# Patient Record
Sex: MALE | Race: WHITE
[De-identification: names, ages, dates, MRNs, and addresses within clinical notes are randomized per-mention and may not be internally consistent; named-entity substitution may affect disease eponyms.]

---

## 2019-03-19 ENCOUNTER — HOSPITAL ENCOUNTER (INPATIENT)
Dept: HOSPITAL 90 - EDH | Age: 74
LOS: 5 days | Discharge: HOME | DRG: 246 | End: 2019-03-24
Attending: FAMILY MEDICINE | Admitting: FAMILY MEDICINE
Payer: COMMERCIAL

## 2019-03-19 VITALS — SYSTOLIC BLOOD PRESSURE: 125 MMHG | DIASTOLIC BLOOD PRESSURE: 84 MMHG

## 2019-03-19 VITALS — DIASTOLIC BLOOD PRESSURE: 103 MMHG | SYSTOLIC BLOOD PRESSURE: 137 MMHG

## 2019-03-19 VITALS — SYSTOLIC BLOOD PRESSURE: 105 MMHG | DIASTOLIC BLOOD PRESSURE: 73 MMHG

## 2019-03-19 VITALS — BODY MASS INDEX: 34.96 KG/M2 | HEIGHT: 72 IN | WEIGHT: 258.1 LBS

## 2019-03-19 DIAGNOSIS — Z79.01: ICD-10-CM

## 2019-03-19 DIAGNOSIS — I34.0: ICD-10-CM

## 2019-03-19 DIAGNOSIS — N18.9: ICD-10-CM

## 2019-03-19 DIAGNOSIS — Z95.818: ICD-10-CM

## 2019-03-19 DIAGNOSIS — I24.8: ICD-10-CM

## 2019-03-19 DIAGNOSIS — I50.23: ICD-10-CM

## 2019-03-19 DIAGNOSIS — E11.21: ICD-10-CM

## 2019-03-19 DIAGNOSIS — I48.92: ICD-10-CM

## 2019-03-19 DIAGNOSIS — I48.91: ICD-10-CM

## 2019-03-19 DIAGNOSIS — G24.9: ICD-10-CM

## 2019-03-19 DIAGNOSIS — E11.22: ICD-10-CM

## 2019-03-19 DIAGNOSIS — Z95.5: ICD-10-CM

## 2019-03-19 DIAGNOSIS — E66.9: ICD-10-CM

## 2019-03-19 DIAGNOSIS — I13.0: Primary | ICD-10-CM

## 2019-03-19 DIAGNOSIS — I25.10: ICD-10-CM

## 2019-03-19 DIAGNOSIS — I42.9: ICD-10-CM

## 2019-03-19 DIAGNOSIS — N17.9: ICD-10-CM

## 2019-03-19 LAB
ALBUMIN SERPL-MCNC: 3.2 G/DL (ref 3.5–5)
ALT SERPL-CCNC: 77 U/L (ref 12–78)
APTT PPP: 35.6 SEC (ref 26.3–35.5)
AST SERPL-CCNC: 44 U/L (ref 10–37)
BASOPHILS NFR BLD AUTO: 1.2 % (ref 0–5)
BILIRUB SERPL-MCNC: 1.3 MG/DL (ref 0.2–1)
BNP SERPL-MCNC: 815 PG/ML (ref 0–100)
BUN SERPL-MCNC: 22 MG/DL (ref 7–18)
CHLORIDE SERPL-SCNC: 103 MMOL/L (ref 101–111)
CK SERPL-CCNC: 45 U/L (ref 21–232)
CK SERPL-CCNC: 50 U/L (ref 21–232)
CO2 SERPL-SCNC: 25 MMOL/L (ref 21–32)
CREAT SERPL-MCNC: 1.7 MG/DL (ref 0.5–1.5)
EOSINOPHIL NFR BLD AUTO: 3.9 % (ref 0–8)
ERYTHROCYTE [DISTWIDTH] IN BLOOD BY AUTOMATED COUNT: 14.3 % (ref 11–15.5)
GFR SERPL CREATININE-BSD FRML MDRD: 42 ML/MIN (ref 60–?)
GLUCOSE SERPL-MCNC: 192 MG/DL (ref 70–105)
HCT VFR BLD AUTO: 45.7 % (ref 42–54)
INR PPP: 1.46 (ref 0.85–1.15)
LYMPHOCYTES NFR SPEC AUTO: 12.6 % (ref 21–51)
MCH RBC QN AUTO: 30.1 PG (ref 27–33)
MCHC RBC AUTO-ENTMCNC: 32.8 G/DL (ref 32–36)
MCV RBC AUTO: 91.9 FL (ref 79–99)
MONOCYTES NFR BLD AUTO: 9.6 % (ref 3–13)
MYOGLOBIN SERPL-MCNC: 63 NG/ML (ref 10–92)
MYOGLOBIN SERPL-MCNC: 77 NG/ML (ref 10–92)
NEUTROPHILS NFR BLD AUTO: 72.7 % (ref 40–77)
NRBC BLD MANUAL-RTO: 0 % (ref 0–0.19)
PLATELET # BLD AUTO: 171 K/UL (ref 130–400)
POTASSIUM SERPL-SCNC: 3.7 MMOL/L (ref 3.5–5.1)
PROT SERPL-MCNC: 6.3 G/DL (ref 6–8.3)
PROTHROMBIN TIME: 15.2 SEC (ref 9.6–11.6)
RBC # BLD AUTO: 4.97 MIL/UL (ref 4.5–6.2)
SODIUM SERPL-SCNC: 139 MMOL/L (ref 136–145)
TROPONIN I SERPL-MCNC: 0.1 NG/ML (ref 0–0.06)
TROPONIN I SERPL-MCNC: 0.11 NG/ML (ref 0–0.06)
WBC # BLD AUTO: 7.9 K/UL (ref 4.8–10.8)

## 2019-03-19 PROCEDURE — 96374 THER/PROPH/DIAG INJ IV PUSH: CPT

## 2019-03-19 PROCEDURE — 93005 ELECTROCARDIOGRAM TRACING: CPT

## 2019-03-19 PROCEDURE — 80048 BASIC METABOLIC PNL TOTAL CA: CPT

## 2019-03-19 PROCEDURE — 4A023N7 MEASUREMENT OF CARDIAC SAMPLING AND PRESSURE, LEFT HEART, PERCUTANEOUS APPROACH: ICD-10-PCS | Performed by: INTERNAL MEDICINE

## 2019-03-19 PROCEDURE — 82550 ASSAY OF CK (CPK): CPT

## 2019-03-19 PROCEDURE — 93458 L HRT ARTERY/VENTRICLE ANGIO: CPT

## 2019-03-19 PROCEDURE — 83880 ASSAY OF NATRIURETIC PEPTIDE: CPT

## 2019-03-19 PROCEDURE — 82948 REAGENT STRIP/BLOOD GLUCOSE: CPT

## 2019-03-19 PROCEDURE — 78452 HT MUSCLE IMAGE SPECT MULT: CPT

## 2019-03-19 PROCEDURE — B2151ZZ FLUOROSCOPY OF LEFT HEART USING LOW OSMOLAR CONTRAST: ICD-10-PCS | Performed by: INTERNAL MEDICINE

## 2019-03-19 PROCEDURE — 85027 COMPLETE CBC AUTOMATED: CPT

## 2019-03-19 PROCEDURE — 85025 COMPLETE CBC W/AUTO DIFF WBC: CPT

## 2019-03-19 PROCEDURE — 85730 THROMBOPLASTIN TIME PARTIAL: CPT

## 2019-03-19 PROCEDURE — 80053 COMPREHEN METABOLIC PANEL: CPT

## 2019-03-19 PROCEDURE — 93017 CV STRESS TEST TRACING ONLY: CPT

## 2019-03-19 PROCEDURE — 85610 PROTHROMBIN TIME: CPT

## 2019-03-19 PROCEDURE — 027034Z DILATION OF CORONARY ARTERY, ONE ARTERY WITH DRUG-ELUTING INTRALUMINAL DEVICE, PERCUTANEOUS APPROACH: ICD-10-PCS | Performed by: INTERNAL MEDICINE

## 2019-03-19 PROCEDURE — 84484 ASSAY OF TROPONIN QUANT: CPT

## 2019-03-19 PROCEDURE — 83874 ASSAY OF MYOGLOBIN: CPT

## 2019-03-19 PROCEDURE — 93306 TTE W/DOPPLER COMPLETE: CPT

## 2019-03-19 PROCEDURE — 83036 HEMOGLOBIN GLYCOSYLATED A1C: CPT

## 2019-03-19 PROCEDURE — 71045 X-RAY EXAM CHEST 1 VIEW: CPT

## 2019-03-19 PROCEDURE — B2111ZZ FLUOROSCOPY OF MULTIPLE CORONARY ARTERIES USING LOW OSMOLAR CONTRAST: ICD-10-PCS | Performed by: INTERNAL MEDICINE

## 2019-03-19 PROCEDURE — 36415 COLL VENOUS BLD VENIPUNCTURE: CPT

## 2019-03-19 RX ADMIN — Medication SCH MG: at 20:26

## 2019-03-19 RX ADMIN — SODIUM CHLORIDE SCH UNIT: 9 INJECTION, SOLUTION INTRAVENOUS at 20:37

## 2019-03-19 RX ADMIN — FUROSEMIDE SCH MG: 10 INJECTION, SOLUTION INTRAMUSCULAR; INTRAVENOUS at 20:25

## 2019-03-19 NOTE — NUR
ADMISSION

RECEIVED PT FROM ER, AMBULATING FROM GURNEY TO BED, GAIT STEADY AND STRONG WITH STAND BY 
ASSIST, A&OX3, CALM COOPERATIVE AND DOES NOT APPEAR TO BE IN ANY DISTRESS NOR ANY NEURO 
DEFICITS. PT DENIES PAIN, DIZZINESS OR NAUSEA BUT DOES C/O DYSPNEA ON EXERTION. PT SITTING 
UP AT BEDSIDE, RESTING COMFORTABLY, CALL LIGHT WITHIN REACH.

## 2019-03-19 NOTE — NUR
ZEKE Hess spoke to pt. regarding Halcion which is not available at this time and pt. agreed to 
have Ativan instead ,to keep him sleep.

## 2019-03-19 NOTE — NUR
Pt. said he found his sleeping pill in his bag and wants to know if its okay for him to take 
it.ZEKE Hess was called and notified and he said its okay.

## 2019-03-20 VITALS — SYSTOLIC BLOOD PRESSURE: 121 MMHG | DIASTOLIC BLOOD PRESSURE: 71 MMHG

## 2019-03-20 VITALS — DIASTOLIC BLOOD PRESSURE: 94 MMHG | SYSTOLIC BLOOD PRESSURE: 139 MMHG

## 2019-03-20 VITALS — DIASTOLIC BLOOD PRESSURE: 85 MMHG | SYSTOLIC BLOOD PRESSURE: 131 MMHG

## 2019-03-20 VITALS — DIASTOLIC BLOOD PRESSURE: 66 MMHG | SYSTOLIC BLOOD PRESSURE: 109 MMHG

## 2019-03-20 VITALS — SYSTOLIC BLOOD PRESSURE: 144 MMHG | DIASTOLIC BLOOD PRESSURE: 74 MMHG

## 2019-03-20 LAB
BUN SERPL-MCNC: 23 MG/DL (ref 7–18)
CHLORIDE SERPL-SCNC: 102 MMOL/L (ref 101–111)
CK SERPL-CCNC: 57 U/L (ref 21–232)
CK SERPL-CCNC: 73 U/L (ref 21–232)
CO2 SERPL-SCNC: 33 MMOL/L (ref 21–32)
CREAT SERPL-MCNC: 1.7 MG/DL (ref 0.5–1.5)
ERYTHROCYTE [DISTWIDTH] IN BLOOD BY AUTOMATED COUNT: 14.4 % (ref 11–15.5)
GFR SERPL CREATININE-BSD FRML MDRD: 42 ML/MIN (ref 60–?)
GLUCOSE SERPL-MCNC: 126 MG/DL (ref 70–105)
HCT VFR BLD AUTO: 47.5 % (ref 42–54)
MCH RBC QN AUTO: 29.9 PG (ref 27–33)
MCHC RBC AUTO-ENTMCNC: 33 G/DL (ref 32–36)
MCV RBC AUTO: 90.8 FL (ref 79–99)
MYOGLOBIN SERPL-MCNC: 123 NG/ML (ref 10–92)
MYOGLOBIN SERPL-MCNC: 95 NG/ML (ref 10–92)
NRBC BLD MANUAL-RTO: 0.1 % (ref 0–0.19)
PLATELET # BLD AUTO: 154 K/UL (ref 130–400)
POTASSIUM SERPL-SCNC: 4 MMOL/L (ref 3.5–5.1)
RBC # BLD AUTO: 5.23 MIL/UL (ref 4.5–6.2)
SODIUM SERPL-SCNC: 144 MMOL/L (ref 136–145)
TROPONIN I SERPL-MCNC: 0.08 NG/ML (ref 0–0.06)
TROPONIN I SERPL-MCNC: 0.1 NG/ML (ref 0–0.06)
WBC # BLD AUTO: 7.4 K/UL (ref 4.8–10.8)

## 2019-03-20 RX ADMIN — SODIUM CHLORIDE SCH UNIT: 9 INJECTION, SOLUTION INTRAVENOUS at 21:00

## 2019-03-20 RX ADMIN — FUROSEMIDE SCH MG: 10 INJECTION, SOLUTION INTRAMUSCULAR; INTRAVENOUS at 20:17

## 2019-03-20 RX ADMIN — REGADENOSON SCH MG: 0.08 INJECTION, SOLUTION INTRAVENOUS at 15:51

## 2019-03-20 RX ADMIN — FAMOTIDINE SCH MG: 10 INJECTION INTRAVENOUS at 09:20

## 2019-03-20 RX ADMIN — SODIUM CHLORIDE SCH UNIT: 9 INJECTION, SOLUTION INTRAVENOUS at 06:07

## 2019-03-20 RX ADMIN — REGADENOSON SCH MG: 0.08 INJECTION, SOLUTION INTRAVENOUS at 06:30

## 2019-03-20 RX ADMIN — Medication SCH MG: at 09:00

## 2019-03-20 RX ADMIN — SODIUM CHLORIDE SCH UNIT: 9 INJECTION, SOLUTION INTRAVENOUS at 11:30

## 2019-03-20 RX ADMIN — ASPIRIN SCH MG: 325 TABLET, FILM COATED ORAL at 09:20

## 2019-03-20 RX ADMIN — Medication SCH MG: at 20:19

## 2019-03-20 RX ADMIN — SODIUM CHLORIDE SCH UNIT: 9 INJECTION, SOLUTION INTRAVENOUS at 16:30

## 2019-03-20 RX ADMIN — FUROSEMIDE SCH MG: 10 INJECTION, SOLUTION INTRAMUSCULAR; INTRAVENOUS at 09:21

## 2019-03-20 NOTE — NUR
DC PLAN



VISITED WITH PATIENT. PATIENT LIVES WITH SPOUSE. INDEPENDENT ABLE TO PERFORM ADL'S. PATIENT 
HAS NO SERVICES OR DME'S. FEELS SAFE TO RETURN HOME. GOT SHAYLEE AS PER MEDICAL RECORDS SO THAT 
RECORDS CAN BE EMAILED TO PATIENT AFTER DISCHARGE. SHAYLEE SIGNED AND ON CHART. 

-------------------------------------------------------------------------------

Addendum: 03/20/19 at 1503 by BRISEIDA SOFIA RN CM

-------------------------------------------------------------------------------

Amended: Links added.

## 2019-03-21 VITALS — DIASTOLIC BLOOD PRESSURE: 73 MMHG | SYSTOLIC BLOOD PRESSURE: 128 MMHG

## 2019-03-21 VITALS — DIASTOLIC BLOOD PRESSURE: 73 MMHG | SYSTOLIC BLOOD PRESSURE: 139 MMHG

## 2019-03-21 VITALS — DIASTOLIC BLOOD PRESSURE: 80 MMHG | SYSTOLIC BLOOD PRESSURE: 144 MMHG

## 2019-03-21 VITALS — SYSTOLIC BLOOD PRESSURE: 130 MMHG | DIASTOLIC BLOOD PRESSURE: 89 MMHG

## 2019-03-21 VITALS — SYSTOLIC BLOOD PRESSURE: 131 MMHG | DIASTOLIC BLOOD PRESSURE: 56 MMHG

## 2019-03-21 VITALS — DIASTOLIC BLOOD PRESSURE: 67 MMHG | SYSTOLIC BLOOD PRESSURE: 114 MMHG

## 2019-03-21 LAB
APTT PPP: 27.6 SEC (ref 26.3–35.5)
BNP SERPL-MCNC: 334 PG/ML (ref 0–100)
BUN SERPL-MCNC: 20 MG/DL (ref 7–18)
BUN SERPL-MCNC: 23 MG/DL (ref 7–18)
CHLORIDE SERPL-SCNC: 100 MMOL/L (ref 101–111)
CHLORIDE SERPL-SCNC: 102 MMOL/L (ref 101–111)
CK SERPL-CCNC: 59 U/L (ref 21–232)
CO2 SERPL-SCNC: 28 MMOL/L (ref 21–32)
CO2 SERPL-SCNC: 30 MMOL/L (ref 21–32)
CREAT SERPL-MCNC: 1.5 MG/DL (ref 0.5–1.5)
CREAT SERPL-MCNC: 1.6 MG/DL (ref 0.5–1.5)
ERYTHROCYTE [DISTWIDTH] IN BLOOD BY AUTOMATED COUNT: 14.3 % (ref 11–15.5)
ERYTHROCYTE [DISTWIDTH] IN BLOOD BY AUTOMATED COUNT: 14.7 % (ref 11–15.5)
GFR SERPL CREATININE-BSD FRML MDRD: 45 ML/MIN (ref 60–?)
GFR SERPL CREATININE-BSD FRML MDRD: 49 ML/MIN (ref 60–?)
GLUCOSE SERPL-MCNC: 115 MG/DL (ref 70–105)
GLUCOSE SERPL-MCNC: 231 MG/DL (ref 70–105)
HBA1C MFR BLD: 7.6 % (ref 4–6)
HCT VFR BLD AUTO: 46 % (ref 42–54)
HCT VFR BLD AUTO: 50.4 % (ref 42–54)
INR PPP: 1.18 (ref 0.85–1.15)
MCH RBC QN AUTO: 30 PG (ref 27–33)
MCH RBC QN AUTO: 30.5 PG (ref 27–33)
MCHC RBC AUTO-ENTMCNC: 32.5 G/DL (ref 32–36)
MCHC RBC AUTO-ENTMCNC: 33.3 G/DL (ref 32–36)
MCV RBC AUTO: 91.6 FL (ref 79–99)
MCV RBC AUTO: 92.2 FL (ref 79–99)
MYOGLOBIN SERPL-MCNC: 86 NG/ML (ref 10–92)
NRBC BLD MANUAL-RTO: 0.1 % (ref 0–0.19)
NRBC BLD MANUAL-RTO: 0.1 % (ref 0–0.19)
PLATELET # BLD AUTO: 184 K/UL (ref 130–400)
PLATELET # BLD AUTO: 200 K/UL (ref 130–400)
POTASSIUM SERPL-SCNC: 3.1 MMOL/L (ref 3.5–5.1)
POTASSIUM SERPL-SCNC: 3.2 MMOL/L (ref 3.5–5.1)
PROTHROMBIN TIME: 12.3 SEC (ref 9.6–11.6)
RBC # BLD AUTO: 5.02 MIL/UL (ref 4.5–6.2)
RBC # BLD AUTO: 5.47 MIL/UL (ref 4.5–6.2)
SODIUM SERPL-SCNC: 140 MMOL/L (ref 136–145)
SODIUM SERPL-SCNC: 142 MMOL/L (ref 136–145)
TROPONIN I SERPL-MCNC: 0.08 NG/ML (ref 0–0.06)
WBC # BLD AUTO: 8.1 K/UL (ref 4.8–10.8)
WBC # BLD AUTO: 8.6 K/UL (ref 4.8–10.8)

## 2019-03-21 RX ADMIN — POTASSIUM CHLORIDE SCH MEQ: 1500 TABLET, EXTENDED RELEASE ORAL at 16:04

## 2019-03-21 RX ADMIN — SODIUM CHLORIDE SCH UNIT: 9 INJECTION, SOLUTION INTRAVENOUS at 21:00

## 2019-03-21 RX ADMIN — Medication SCH MG: at 09:46

## 2019-03-21 RX ADMIN — SPIRONOLACTONE SCH MG: 25 TABLET, FILM COATED ORAL at 09:46

## 2019-03-21 RX ADMIN — SODIUM CHLORIDE SCH UNIT: 9 INJECTION, SOLUTION INTRAVENOUS at 12:58

## 2019-03-21 RX ADMIN — FAMOTIDINE SCH MG: 10 INJECTION INTRAVENOUS at 09:47

## 2019-03-21 RX ADMIN — FUROSEMIDE SCH MG: 10 INJECTION, SOLUTION INTRAMUSCULAR; INTRAVENOUS at 04:32

## 2019-03-21 RX ADMIN — SODIUM CHLORIDE SCH UNIT: 9 INJECTION, SOLUTION INTRAVENOUS at 05:38

## 2019-03-21 RX ADMIN — SODIUM CHLORIDE SCH UNIT: 9 INJECTION, SOLUTION INTRAVENOUS at 16:50

## 2019-03-21 RX ADMIN — ASPIRIN SCH MG: 325 TABLET, FILM COATED ORAL at 09:45

## 2019-03-21 RX ADMIN — POTASSIUM CHLORIDE SCH MEQ: 1500 TABLET, EXTENDED RELEASE ORAL at 09:47

## 2019-03-21 RX ADMIN — Medication SCH MG: at 19:38

## 2019-03-21 RX ADMIN — LISINOPRIL SCH MG: 2.5 TABLET ORAL at 09:46

## 2019-03-22 VITALS — DIASTOLIC BLOOD PRESSURE: 64 MMHG | SYSTOLIC BLOOD PRESSURE: 110 MMHG

## 2019-03-22 VITALS — DIASTOLIC BLOOD PRESSURE: 73 MMHG | SYSTOLIC BLOOD PRESSURE: 123 MMHG

## 2019-03-22 VITALS — DIASTOLIC BLOOD PRESSURE: 79 MMHG | SYSTOLIC BLOOD PRESSURE: 113 MMHG

## 2019-03-22 VITALS — DIASTOLIC BLOOD PRESSURE: 65 MMHG | SYSTOLIC BLOOD PRESSURE: 126 MMHG

## 2019-03-22 VITALS — SYSTOLIC BLOOD PRESSURE: 136 MMHG | DIASTOLIC BLOOD PRESSURE: 92 MMHG

## 2019-03-22 VITALS — SYSTOLIC BLOOD PRESSURE: 115 MMHG | DIASTOLIC BLOOD PRESSURE: 79 MMHG

## 2019-03-22 VITALS — DIASTOLIC BLOOD PRESSURE: 91 MMHG | SYSTOLIC BLOOD PRESSURE: 122 MMHG

## 2019-03-22 LAB
BUN SERPL-MCNC: 23 MG/DL (ref 7–18)
CHLORIDE SERPL-SCNC: 103 MMOL/L (ref 101–111)
CO2 SERPL-SCNC: 29 MMOL/L (ref 21–32)
CREAT SERPL-MCNC: 1.6 MG/DL (ref 0.5–1.5)
GFR SERPL CREATININE-BSD FRML MDRD: 45 ML/MIN (ref 60–?)
GLUCOSE SERPL-MCNC: 126 MG/DL (ref 70–105)
POTASSIUM SERPL-SCNC: 3.6 MMOL/L (ref 3.5–5.1)
SODIUM SERPL-SCNC: 143 MMOL/L (ref 136–145)

## 2019-03-22 RX ADMIN — SODIUM CHLORIDE SCH UNIT: 9 INJECTION, SOLUTION INTRAVENOUS at 16:30

## 2019-03-22 RX ADMIN — Medication SCH MG: at 21:13

## 2019-03-22 RX ADMIN — SODIUM CHLORIDE SCH UNIT: 9 INJECTION, SOLUTION INTRAVENOUS at 21:17

## 2019-03-22 RX ADMIN — LISINOPRIL SCH MG: 2.5 TABLET ORAL at 08:55

## 2019-03-22 RX ADMIN — SPIRONOLACTONE SCH MG: 25 TABLET, FILM COATED ORAL at 08:53

## 2019-03-22 RX ADMIN — FAMOTIDINE SCH MG: 10 INJECTION INTRAVENOUS at 08:55

## 2019-03-22 RX ADMIN — ASPIRIN SCH MG: 325 TABLET, FILM COATED ORAL at 08:54

## 2019-03-22 RX ADMIN — SODIUM CHLORIDE SCH UNIT: 9 INJECTION, SOLUTION INTRAVENOUS at 06:36

## 2019-03-22 RX ADMIN — Medication SCH MG: at 08:54

## 2019-03-22 RX ADMIN — SODIUM CHLORIDE SCH UNIT: 9 INJECTION, SOLUTION INTRAVENOUS at 11:30

## 2019-03-22 NOTE — NUR
DC PLAN



PATIENT PENDING LIFE VEST TO BE SET UP. DR. ALBA TO FILL SCRIPT AFTER HEART CATH. CALLED 
PATIENT STILL PENDING TO BE TAKEN DOWN FOR PROCEDURE.

## 2019-03-22 NOTE — NUR
Received bedside report ,pt. is for left heart cath already scheduled as per report.Pt. is 
aware about the procedure and instructed to be NPO postmidnight and verbalized 
understanding, Consent already ready in the chart as per report.

## 2019-03-23 VITALS — SYSTOLIC BLOOD PRESSURE: 116 MMHG | DIASTOLIC BLOOD PRESSURE: 76 MMHG

## 2019-03-23 VITALS — DIASTOLIC BLOOD PRESSURE: 67 MMHG | SYSTOLIC BLOOD PRESSURE: 120 MMHG

## 2019-03-23 VITALS — SYSTOLIC BLOOD PRESSURE: 138 MMHG | DIASTOLIC BLOOD PRESSURE: 85 MMHG

## 2019-03-23 VITALS — DIASTOLIC BLOOD PRESSURE: 62 MMHG | SYSTOLIC BLOOD PRESSURE: 111 MMHG

## 2019-03-23 VITALS — SYSTOLIC BLOOD PRESSURE: 128 MMHG | DIASTOLIC BLOOD PRESSURE: 86 MMHG

## 2019-03-23 VITALS — SYSTOLIC BLOOD PRESSURE: 148 MMHG | DIASTOLIC BLOOD PRESSURE: 87 MMHG

## 2019-03-23 VITALS — SYSTOLIC BLOOD PRESSURE: 97 MMHG | DIASTOLIC BLOOD PRESSURE: 74 MMHG

## 2019-03-23 VITALS — SYSTOLIC BLOOD PRESSURE: 136 MMHG | DIASTOLIC BLOOD PRESSURE: 74 MMHG

## 2019-03-23 VITALS — DIASTOLIC BLOOD PRESSURE: 81 MMHG | SYSTOLIC BLOOD PRESSURE: 134 MMHG

## 2019-03-23 VITALS — SYSTOLIC BLOOD PRESSURE: 124 MMHG | DIASTOLIC BLOOD PRESSURE: 77 MMHG

## 2019-03-23 VITALS — DIASTOLIC BLOOD PRESSURE: 85 MMHG | SYSTOLIC BLOOD PRESSURE: 118 MMHG

## 2019-03-23 VITALS — SYSTOLIC BLOOD PRESSURE: 137 MMHG | DIASTOLIC BLOOD PRESSURE: 91 MMHG

## 2019-03-23 VITALS — DIASTOLIC BLOOD PRESSURE: 86 MMHG | SYSTOLIC BLOOD PRESSURE: 131 MMHG

## 2019-03-23 LAB
BUN SERPL-MCNC: 21 MG/DL (ref 7–18)
CHLORIDE SERPL-SCNC: 103 MMOL/L (ref 101–111)
CO2 SERPL-SCNC: 32 MMOL/L (ref 21–32)
CREAT SERPL-MCNC: 1.5 MG/DL (ref 0.5–1.5)
GFR SERPL CREATININE-BSD FRML MDRD: 49 ML/MIN (ref 60–?)
GLUCOSE SERPL-MCNC: 97 MG/DL (ref 70–105)
POTASSIUM SERPL-SCNC: 3.3 MMOL/L (ref 3.5–5.1)
SODIUM SERPL-SCNC: 141 MMOL/L (ref 136–145)

## 2019-03-23 RX ADMIN — Medication SCH MG: at 20:37

## 2019-03-23 RX ADMIN — FAMOTIDINE SCH MG: 10 INJECTION INTRAVENOUS at 08:07

## 2019-03-23 RX ADMIN — SODIUM CHLORIDE SCH UNIT: 9 INJECTION, SOLUTION INTRAVENOUS at 20:37

## 2019-03-23 RX ADMIN — Medication SCH MG: at 08:07

## 2019-03-23 RX ADMIN — POTASSIUM CHLORIDE PRN MEQ: 1.5 SOLUTION ORAL at 23:17

## 2019-03-23 RX ADMIN — SODIUM CHLORIDE SCH UNIT: 9 INJECTION, SOLUTION INTRAVENOUS at 11:30

## 2019-03-23 RX ADMIN — SODIUM CHLORIDE SCH UNIT: 9 INJECTION, SOLUTION INTRAVENOUS at 16:30

## 2019-03-23 RX ADMIN — SODIUM CHLORIDE SCH UNIT: 9 INJECTION, SOLUTION INTRAVENOUS at 06:28

## 2019-03-23 RX ADMIN — LISINOPRIL SCH MG: 2.5 TABLET ORAL at 09:00

## 2019-03-23 RX ADMIN — SPIRONOLACTONE SCH MG: 25 TABLET, FILM COATED ORAL at 09:00

## 2019-03-23 RX ADMIN — ASPIRIN SCH MG: 325 TABLET, FILM COATED ORAL at 09:00

## 2019-03-23 NOTE — NUR
PT STATED HE NEEDS TO TAKE HIS HALCION MED FROM HOME.  CHECKED FOR ORDER TO USE HOME 
MEDICATION.  ORDER WRITTEN ON 3/19/19.  PATIENT TOOK HALCION 0.25MG PO.

## 2019-03-23 NOTE — NUR
PT VOICED CONCERNS ABOUT HIS PROLONGED HOSPITAL STAY AND HIS TRAVEL INSURANCE. I SPOKE WITH 
 HÉCTOR SOARES ABOUT THIS.

## 2019-03-23 NOTE — NUR
RIGHT GROIN SOFT, DRESSING DRY AND INTACT.  ASSISTED PT TO REPOSITION IN BED.  INSTRUCTED PT 
TO CALL FOR ASSISTANCE WHEN WANTING TO GET OUT OF BED.  VERBALIZED UNDERSTANDING.

## 2019-03-23 NOTE — NUR
Pt. remained NPO for cardiac catheterization today.Report given to incoming NOD using SBAR 
all questions answered.Pt. has no s/s hypoglycemia.Remained alert and coherent

## 2019-03-23 NOTE — NUR
K+ 3.3 (RESULT FROM EARLIER THIS EVENING)---INITIATED HYPOKALEMIA PROTOCOL.  WILL GIVE 3 
DOSES OF KCL 10 MEQ EVERY 2 HOURS.

## 2019-03-23 NOTE — NUR
CALLED CATH LAB AND SPOKE WITH AGUILAR FREEMAN. HE SAID THAT PT IS SCHEDULED FOR 11AM HEART CATH 
WITH DR. FLORES BUT CATH LAB TEAM WERE CALLED FOR IR PROCEDURE AT THIS TIME. RASHEEDA JOHNSON WAS 
PAGED AND HAS NOT CALLED BACK. AGUILAR FREEMAN SAID THAT HE WILL GIVE ME A CALL ONCE HE RECEIVES 
WORD FROM DR. FLORES OR RASHEEDA JOHNSON.

## 2019-03-23 NOTE — NUR
SPOKE WITH AGUILAR FREEMAN AND SAID THAT DR. FLORES HAS NOT CALLED BACK. PATIENT MAY BE TAKEN TO 
CATH LAB LATE THIS AFTERNOON.

## 2019-03-23 NOTE — NUR
RECEIVED PATIENT FROM CATH LAB S/P LEFT HEART CATH WITH STENT PLACEMENT BY DR. FLORES. 
RIGHT GROIN DRESSING IS CLEAN, DRY, AND INTACT. SITE IS SOFT ON PALPATION. NO HEMATOMA, 
BLEEDING, NOR OOZING NOTED TO SITE. LOWER EXTREMITY IS WARM. PEDAL PULSE IS STRONG. PATIENT 
TO REMAIN ON BEDREST FOR 2 HOURS AS ORDERED BY DR. FLORES. ACTIVITY RESTRICTIONS DISCUSSED 
WITH HIM AND HIS WIFE. BOTH VERBALIZED UNDERSTANDING. VITAL SIGNS TAKEN AND WILL BE 
MONITORED PER PROTOCOL. EKG DONE AS ORDERED.

## 2019-03-24 VITALS — DIASTOLIC BLOOD PRESSURE: 63 MMHG | SYSTOLIC BLOOD PRESSURE: 97 MMHG

## 2019-03-24 VITALS — SYSTOLIC BLOOD PRESSURE: 123 MMHG | DIASTOLIC BLOOD PRESSURE: 89 MMHG

## 2019-03-24 VITALS — DIASTOLIC BLOOD PRESSURE: 60 MMHG | SYSTOLIC BLOOD PRESSURE: 124 MMHG

## 2019-03-24 LAB
BASOPHILS NFR BLD AUTO: 1.2 % (ref 0–5)
BUN SERPL-MCNC: 21 MG/DL (ref 7–18)
CHLORIDE SERPL-SCNC: 104 MMOL/L (ref 101–111)
CO2 SERPL-SCNC: 30 MMOL/L (ref 21–32)
CREAT SERPL-MCNC: 1.5 MG/DL (ref 0.5–1.5)
EOSINOPHIL NFR BLD AUTO: 5.2 % (ref 0–8)
ERYTHROCYTE [DISTWIDTH] IN BLOOD BY AUTOMATED COUNT: 14.4 % (ref 11–15.5)
GFR SERPL CREATININE-BSD FRML MDRD: 49 ML/MIN (ref 60–?)
GLUCOSE SERPL-MCNC: 143 MG/DL (ref 70–105)
HCT VFR BLD AUTO: 49 % (ref 42–54)
LYMPHOCYTES NFR SPEC AUTO: 20.2 % (ref 21–51)
MCH RBC QN AUTO: 30.4 PG (ref 27–33)
MCHC RBC AUTO-ENTMCNC: 32.8 G/DL (ref 32–36)
MCV RBC AUTO: 92.8 FL (ref 79–99)
MONOCYTES NFR BLD AUTO: 12.5 % (ref 3–13)
NEUTROPHILS NFR BLD AUTO: 60.9 % (ref 40–77)
NRBC BLD MANUAL-RTO: 0 % (ref 0–0.19)
PLATELET # BLD AUTO: 173 K/UL (ref 130–400)
POTASSIUM SERPL-SCNC: 3.8 MMOL/L (ref 3.5–5.1)
RBC # BLD AUTO: 5.28 MIL/UL (ref 4.5–6.2)
SODIUM SERPL-SCNC: 141 MMOL/L (ref 136–145)
WBC # BLD AUTO: 7.9 K/UL (ref 4.8–10.8)

## 2019-03-24 RX ADMIN — FAMOTIDINE SCH MG: 10 INJECTION INTRAVENOUS at 08:40

## 2019-03-24 RX ADMIN — SPIRONOLACTONE SCH MG: 25 TABLET, FILM COATED ORAL at 08:39

## 2019-03-24 RX ADMIN — SODIUM CHLORIDE SCH UNIT: 9 INJECTION, SOLUTION INTRAVENOUS at 06:15

## 2019-03-24 RX ADMIN — SODIUM CHLORIDE SCH UNIT: 9 INJECTION, SOLUTION INTRAVENOUS at 12:06

## 2019-03-24 RX ADMIN — LISINOPRIL SCH MG: 2.5 TABLET ORAL at 08:40

## 2019-03-24 RX ADMIN — ASPIRIN SCH MG: 325 TABLET, FILM COATED ORAL at 08:40

## 2019-03-24 RX ADMIN — POTASSIUM CHLORIDE PRN MEQ: 1.5 SOLUTION ORAL at 03:10

## 2019-03-24 RX ADMIN — Medication SCH MG: at 08:40

## 2019-03-24 RX ADMIN — POTASSIUM CHLORIDE PRN MEQ: 1.5 SOLUTION ORAL at 02:09

## 2019-03-24 NOTE — NUR
cm note

spoke to rep Diana  from [employer]- [phone]. and updated her that information has 
been faxed to [employer], regarding pt need for life vest. also informed that pt states he will be 
staying temporarilly at [place] [phone]. for about aweek. then traveling back 
to [facility] as listed on face sheet. and eventually approximately about a month 
later will travel back to [facility]. states that she can work with this pt on the Life vest, 
but needs to get approved first by insurance. informed diana that pt is requesting to go home 
today. and David Chavez in agreement for him to dc even if not approved for life vest 
yet. pt was informed that there is a possibility that they may not approve the life vest, 
but that MD would be informed of this as well. call made to TYSON mendoza to update on above, 
and made aware that there is always possibility may not get approved for life vest, but they 
would notify his office. he verbalizes understanding.